# Patient Record
Sex: MALE | Race: WHITE | NOT HISPANIC OR LATINO | URBAN - METROPOLITAN AREA
[De-identification: names, ages, dates, MRNs, and addresses within clinical notes are randomized per-mention and may not be internally consistent; named-entity substitution may affect disease eponyms.]

---

## 2017-06-08 ENCOUNTER — EMERGENCY (EMERGENCY)
Facility: HOSPITAL | Age: 32
LOS: 0 days | Discharge: HOME | End: 2017-06-09
Attending: EMERGENCY MEDICINE | Admitting: EMERGENCY MEDICINE
Payer: MEDICAID

## 2017-06-08 VITALS
HEART RATE: 77 BPM | RESPIRATION RATE: 17 BRPM | DIASTOLIC BLOOD PRESSURE: 83 MMHG | OXYGEN SATURATION: 100 % | TEMPERATURE: 98 F | SYSTOLIC BLOOD PRESSURE: 116 MMHG

## 2017-06-08 VITALS — WEIGHT: 154.98 LBS | HEIGHT: 71 IN

## 2017-06-08 DIAGNOSIS — M54.5 LOW BACK PAIN: ICD-10-CM

## 2017-06-08 DIAGNOSIS — M54.30 SCIATICA, UNSPECIFIED SIDE: ICD-10-CM

## 2017-06-08 LAB
ALBUMIN SERPL ELPH-MCNC: 4.1 G/DL — SIGNIFICANT CHANGE UP (ref 3.3–5)
ALP SERPL-CCNC: 70 U/L — SIGNIFICANT CHANGE UP (ref 40–120)
ALT FLD-CCNC: 24 U/L — SIGNIFICANT CHANGE UP (ref 12–78)
ANION GAP SERPL CALC-SCNC: 3 MMOL/L — LOW (ref 5–17)
APTT BLD: 28 SEC — SIGNIFICANT CHANGE UP (ref 27.5–37.4)
AST SERPL-CCNC: 15 U/L — SIGNIFICANT CHANGE UP (ref 15–37)
BASOPHILS # BLD AUTO: 0.1 K/UL — SIGNIFICANT CHANGE UP (ref 0–0.2)
BASOPHILS NFR BLD AUTO: 0.9 % — SIGNIFICANT CHANGE UP (ref 0–2)
BILIRUB SERPL-MCNC: 0.4 MG/DL — SIGNIFICANT CHANGE UP (ref 0.2–1.2)
BUN SERPL-MCNC: 10 MG/DL — SIGNIFICANT CHANGE UP (ref 7–23)
CALCIUM SERPL-MCNC: 8.7 MG/DL — SIGNIFICANT CHANGE UP (ref 8.5–10.1)
CHLORIDE SERPL-SCNC: 105 MMOL/L — SIGNIFICANT CHANGE UP (ref 96–108)
CO2 SERPL-SCNC: 30 MMOL/L — SIGNIFICANT CHANGE UP (ref 22–31)
CREAT SERPL-MCNC: 0.77 MG/DL — SIGNIFICANT CHANGE UP (ref 0.5–1.3)
EOSINOPHIL # BLD AUTO: 0 K/UL — SIGNIFICANT CHANGE UP (ref 0–0.5)
EOSINOPHIL NFR BLD AUTO: 0.5 % — SIGNIFICANT CHANGE UP (ref 0–6)
ERYTHROCYTE [SEDIMENTATION RATE] IN BLOOD: 2 MM/HR — SIGNIFICANT CHANGE UP (ref 0–15)
GLUCOSE SERPL-MCNC: 96 MG/DL — SIGNIFICANT CHANGE UP (ref 70–99)
HCT VFR BLD CALC: 45.7 % — SIGNIFICANT CHANGE UP (ref 39–50)
HGB BLD-MCNC: 15.5 G/DL — SIGNIFICANT CHANGE UP (ref 13–17)
INR BLD: 0.95 RATIO — SIGNIFICANT CHANGE UP (ref 0.88–1.16)
LYMPHOCYTES # BLD AUTO: 1.7 K/UL — SIGNIFICANT CHANGE UP (ref 1–3.3)
LYMPHOCYTES # BLD AUTO: 19.5 % — SIGNIFICANT CHANGE UP (ref 13–44)
MCHC RBC-ENTMCNC: 29.7 PG — SIGNIFICANT CHANGE UP (ref 27–34)
MCHC RBC-ENTMCNC: 34 GM/DL — SIGNIFICANT CHANGE UP (ref 32–36)
MCV RBC AUTO: 87.4 FL — SIGNIFICANT CHANGE UP (ref 80–100)
MONOCYTES # BLD AUTO: 0.6 K/UL — SIGNIFICANT CHANGE UP (ref 0–0.9)
MONOCYTES NFR BLD AUTO: 6.3 % — SIGNIFICANT CHANGE UP (ref 2–14)
NEUTROPHILS # BLD AUTO: 6.5 K/UL — SIGNIFICANT CHANGE UP (ref 1.8–7.4)
NEUTROPHILS NFR BLD AUTO: 72.8 % — SIGNIFICANT CHANGE UP (ref 43–77)
PLATELET # BLD AUTO: 149 K/UL — LOW (ref 150–400)
POTASSIUM SERPL-MCNC: 4.2 MMOL/L — SIGNIFICANT CHANGE UP (ref 3.5–5.3)
POTASSIUM SERPL-SCNC: 4.2 MMOL/L — SIGNIFICANT CHANGE UP (ref 3.5–5.3)
PROT SERPL-MCNC: 6.9 GM/DL — SIGNIFICANT CHANGE UP (ref 6–8.3)
PROTHROM AB SERPL-ACNC: 10.3 SEC — SIGNIFICANT CHANGE UP (ref 9.8–12.7)
RBC # BLD: 5.23 M/UL — SIGNIFICANT CHANGE UP (ref 4.2–5.8)
RBC # FLD: 12.1 % — SIGNIFICANT CHANGE UP (ref 10.3–14.5)
SODIUM SERPL-SCNC: 138 MMOL/L — SIGNIFICANT CHANGE UP (ref 135–145)
WBC # BLD: 8.9 K/UL — SIGNIFICANT CHANGE UP (ref 3.8–10.5)
WBC # FLD AUTO: 8.9 K/UL — SIGNIFICANT CHANGE UP (ref 3.8–10.5)

## 2017-06-08 PROCEDURE — 99285 EMERGENCY DEPT VISIT HI MDM: CPT

## 2017-06-08 PROCEDURE — 74177 CT ABD & PELVIS W/CONTRAST: CPT | Mod: 26

## 2017-06-08 PROCEDURE — 72100 X-RAY EXAM L-S SPINE 2/3 VWS: CPT | Mod: 26

## 2017-06-08 RX ORDER — HYDROMORPHONE HYDROCHLORIDE 2 MG/ML
1 INJECTION INTRAMUSCULAR; INTRAVENOUS; SUBCUTANEOUS ONCE
Qty: 0 | Refills: 0 | Status: DISCONTINUED | OUTPATIENT
Start: 2017-06-08 | End: 2017-06-08

## 2017-06-08 RX ORDER — KETOROLAC TROMETHAMINE 30 MG/ML
30 SYRINGE (ML) INJECTION ONCE
Qty: 0 | Refills: 0 | Status: DISCONTINUED | OUTPATIENT
Start: 2017-06-08 | End: 2017-06-08

## 2017-06-08 RX ORDER — MORPHINE SULFATE 50 MG/1
4 CAPSULE, EXTENDED RELEASE ORAL ONCE
Qty: 0 | Refills: 0 | Status: DISCONTINUED | OUTPATIENT
Start: 2017-06-08 | End: 2017-06-08

## 2017-06-08 RX ORDER — SODIUM CHLORIDE 9 MG/ML
1000 INJECTION INTRAMUSCULAR; INTRAVENOUS; SUBCUTANEOUS ONCE
Qty: 0 | Refills: 0 | Status: COMPLETED | OUTPATIENT
Start: 2017-06-08 | End: 2017-06-08

## 2017-06-08 RX ADMIN — SODIUM CHLORIDE 1500 MILLILITER(S): 9 INJECTION INTRAMUSCULAR; INTRAVENOUS; SUBCUTANEOUS at 20:20

## 2017-06-08 RX ADMIN — Medication 30 MILLIGRAM(S): at 20:35

## 2017-06-08 RX ADMIN — Medication 30 MILLIGRAM(S): at 20:20

## 2017-06-08 RX ADMIN — HYDROMORPHONE HYDROCHLORIDE 1 MILLIGRAM(S): 2 INJECTION INTRAMUSCULAR; INTRAVENOUS; SUBCUTANEOUS at 21:48

## 2017-06-08 NOTE — ED STATDOCS - OBJECTIVE STATEMENT
32 y/o M with a h/o bulging disc to lower back a few years ago, HTN, presenting to ED for worsening lower back pain, radiating to "love handle" area, x5 days. Initially, the pain started out as sharp, then started to radiate across lower back, feeling like a band around the area. Pt denies fevers, vomiting, diarrhea. He was seen by his PCP, and was told he had swollen lymph nodes to lower back. Pt was given prednisone and pain meds with no improvement in sx. +occasional cramps to bilat LE. NKDA. Pt is on gabapentin 600 mg twice per day. 32 y/o M with a h/o bulging disc to lower back a few years ago on meloxicam (last took meloxicam this AM), HTN, presenting to ED for worsening lower back pain, radiating to "love handle" area, x5 days. Initially, the pain started out as sharp, then started to radiate across lower back, feeling like a band around the area. Pt denies fevers, vomiting, diarrhea. He was seen by his PCP, and was told he had swollen lymph nodes to lower back, occasionally feeling pain to lower abdominal area bilat.. Pt was given prednisone and pain meds with no improvement in sx. +occasional cramps to bilat LE. NKDA. Pt is on gabapentin 600 mg twice per day. 32 y/o M with a h/o bulging disc to lower back a few years ago on meloxicam (last took meloxicam this AM), HTN, presenting to ED for worsening lower back pain, radiating to "love handle" area, x5 days. Initially, the pain started out as sharp, then started to radiate across lower back, feeling like a band around the area. Pt denies fevers, vomiting, diarrhea. He was seen by his PCP, and was told he had swollen lymph nodes to lower back, occasionally feeling pain to lower abdominal area bilat. Pt was given prednisone and pain meds with no improvement in sx.  Pt started on abx a few days ago as well by his PCP because his significant other was recently dx with C. diff, however, pt denies any diarrhea. +occasional cramps to bilat LE. NKDA. Pt is on gabapentin 600 mg twice per day.

## 2017-06-08 NOTE — ED ADULT NURSE REASSESSMENT NOTE - NS ED NURSE REASSESS COMMENT FT1
pt sitting up comfortably in chair, ambulatory in ED, states pain has improved but persists. VSS, will continue to monitor.

## 2017-06-08 NOTE — ED ADULT NURSE NOTE - OBJECTIVE STATEMENT
Pt states he has been having lower back pain x 5 days.  Denies N/V?diarrhea or fever.  pt states that he feels "bumps in his lower back"/

## 2017-06-08 NOTE — ED STATDOCS - NS ED MD SCRIBE ATTENDING SCRIBE SECTIONS
REVIEW OF SYSTEMS/PHYSICAL EXAM/PAST MEDICAL/SURGICAL/SOCIAL HISTORY/DISPOSITION/HISTORY OF PRESENT ILLNESS/RESULTS/PROGRESS NOTE

## 2017-06-08 NOTE — ED STATDOCS - MUSCULOSKELETAL FINDINGS, MLM
mild tenderness in regions of bilat PSIS. ?subcentimeter lymphadenopathy vs lipomas. nml gait. strength 5/5. sensation intact.

## 2017-06-08 NOTE — ED STATDOCS - MEDICAL DECISION MAKING DETAILS
31y M w/ back pain radiating to flank.  Concern for musculoskeletal pain vs renal stone.  Plan for labs, imaging, reassess.

## 2017-06-08 NOTE — ED STATDOCS - PROGRESS NOTE DETAILS
REINIER Mireles: Patient has been seen, evaluated and orders have been written by the attending in intake. Patient is stable.  I will follow up the results of orders written and I will continue to evaluate/observe the patient. signed Jihan Mireles PA-C   Pt CT results pending. Feels better after dilaudid and toradol. musculoskeletal pain vs possible intraabdominal pathology. Dr Godfrey to f/u on results. Attending Godfrey, reviewed with pt result so test.  plan d/c and follow up with pmd.

## 2017-06-08 NOTE — ED STATDOCS - CARE PLAN
Principal Discharge DX:	Acute low back pain, unspecified back pain laterality, with sciatica presence unspecified

## 2017-06-09 RX ORDER — IBUPROFEN 200 MG
1 TABLET ORAL
Qty: 30 | Refills: 0 | OUTPATIENT
Start: 2017-06-09

## 2017-06-09 RX ORDER — CYCLOBENZAPRINE HYDROCHLORIDE 10 MG/1
1 TABLET, FILM COATED ORAL
Qty: 15 | Refills: 0 | OUTPATIENT
Start: 2017-06-09

## 2017-09-10 ENCOUNTER — TRANSCRIPTION ENCOUNTER (OUTPATIENT)
Age: 32
End: 2017-09-10

## 2017-09-11 ENCOUNTER — RESULT REVIEW (OUTPATIENT)
Age: 32
End: 2017-09-11

## 2017-09-11 ENCOUNTER — OUTPATIENT (OUTPATIENT)
Dept: OUTPATIENT SERVICES | Facility: HOSPITAL | Age: 32
LOS: 1 days | Discharge: HOME | End: 2017-09-11
Payer: MEDICAID

## 2017-09-11 DIAGNOSIS — D48.1 NEOPLASM OF UNCERTAIN BEHAVIOR OF CONNECTIVE AND OTHER SOFT TISSUE: ICD-10-CM

## 2017-09-11 PROCEDURE — 11404 EXC TR-EXT B9+MARG 3.1-4 CM: CPT

## 2017-09-11 PROCEDURE — 88304 TISSUE EXAM BY PATHOLOGIST: CPT

## 2017-09-11 PROCEDURE — 88304 TISSUE EXAM BY PATHOLOGIST: CPT | Mod: 26

## 2017-09-11 PROCEDURE — 12032 INTMD RPR S/A/T/EXT 2.6-7.5: CPT

## 2017-09-15 DIAGNOSIS — D17.1 BENIGN LIPOMATOUS NEOPLASM OF SKIN AND SUBCUTANEOUS TISSUE OF TRUNK: ICD-10-CM

## 2017-09-15 DIAGNOSIS — E03.9 HYPOTHYROIDISM, UNSPECIFIED: ICD-10-CM

## 2017-10-10 ENCOUNTER — TRANSCRIPTION ENCOUNTER (OUTPATIENT)
Age: 32
End: 2017-10-10

## 2017-10-19 ENCOUNTER — EMERGENCY (EMERGENCY)
Facility: HOSPITAL | Age: 32
LOS: 0 days | Discharge: HOME | End: 2017-10-19
Attending: EMERGENCY MEDICINE | Admitting: EMERGENCY MEDICINE
Payer: MEDICAID

## 2017-10-19 VITALS — HEIGHT: 71 IN | WEIGHT: 151.9 LBS

## 2017-10-19 VITALS
RESPIRATION RATE: 18 BRPM | DIASTOLIC BLOOD PRESSURE: 87 MMHG | OXYGEN SATURATION: 98 % | HEART RATE: 77 BPM | SYSTOLIC BLOOD PRESSURE: 137 MMHG | TEMPERATURE: 98 F

## 2017-10-19 DIAGNOSIS — R51 HEADACHE: ICD-10-CM

## 2017-10-19 DIAGNOSIS — G89.29 OTHER CHRONIC PAIN: ICD-10-CM

## 2017-10-19 DIAGNOSIS — M54.9 DORSALGIA, UNSPECIFIED: ICD-10-CM

## 2017-10-19 DIAGNOSIS — M54.2 CERVICALGIA: ICD-10-CM

## 2017-10-19 PROCEDURE — 99283 EMERGENCY DEPT VISIT LOW MDM: CPT

## 2017-10-19 RX ORDER — KETOROLAC TROMETHAMINE 30 MG/ML
30 SYRINGE (ML) INJECTION ONCE
Qty: 0 | Refills: 0 | Status: DISCONTINUED | OUTPATIENT
Start: 2017-10-19 | End: 2017-10-19

## 2017-10-19 RX ORDER — HYDROMORPHONE HYDROCHLORIDE 2 MG/ML
1 INJECTION INTRAMUSCULAR; INTRAVENOUS; SUBCUTANEOUS ONCE
Qty: 0 | Refills: 0 | Status: DISCONTINUED | OUTPATIENT
Start: 2017-10-19 | End: 2017-10-19

## 2017-10-19 RX ADMIN — HYDROMORPHONE HYDROCHLORIDE 1 MILLIGRAM(S): 2 INJECTION INTRAMUSCULAR; INTRAVENOUS; SUBCUTANEOUS at 17:23

## 2017-10-19 RX ADMIN — Medication 30 MILLIGRAM(S): at 17:23

## 2017-10-19 NOTE — ED STATDOCS - PHYSICAL EXAMINATION
GEN: AOX3, NAD. HEENT: Throat clear. Head NC/AT. NECK: Supple, No JVD. FROM. C-spine non-tender. CV:S1S2, RRR, LUNGS: CTA/b/l, no w/r/r. ABD: Soft, NT/ND, no rebound, no guarding. No CVAT. EXT: No e/c/c. 2+ distal pulses. SKIN: No rashes. NEURO: No focal deficits. CN II-XII intact. FROM. 5/5 motor and sensory. REINIER Reddy

## 2017-10-19 NOTE — ED STATDOCS - PROGRESS NOTE DETAILS
Patient is feeling much better, tests/labs reviewed. case discussed with attending. OK to dc home. REINIER Reddy Patient is well known to Dr. Mobley. Dr. Mobley recommends one dose of Toradol and Dilaudid and then DC home. No prescriptions for narcotics because patient has pain management. Patient is feeling much better, tests/labs reviewed. case discussed with attending. OK to dc home. REINIER Reddy

## 2017-10-19 NOTE — ED STATDOCS - MEDICAL DECISION MAKING DETAILS
Exacerbation of chronic pain.  Given pain meds in ED. No Rx for narcotics.  F/u with pain management.

## 2017-10-19 NOTE — ED ADULT NURSE NOTE - OBJECTIVE STATEMENT
pt amb to ST1 w/ cane, hx of herniated discs, and spinal lipomas presents to the ED c/o chronic body pain. +Back pain, HA, Neck pain starting a few days ago. Pt says he fell over a few days ago in pain and needed help getting off the floor. Pt says he his pain keeps getting worse with no relief. SL placed, no labs ordered @ this time, meds as per orders, will reassess.

## 2017-10-19 NOTE — ED ADULT TRIAGE NOTE - CHIEF COMPLAINT QUOTE
Pt. to the ED C/O generalized body pain and back pain- Hx. of Back pain with removal of Lympoma Tumors  in the past - Pt. states being on pain management for chronic pain

## 2017-10-19 NOTE — ED STATDOCS - OBJECTIVE STATEMENT
33 y/o male with hx of herniated discs, and spinal lipomas presents to the ED c/o chronic body pain. +Back pain, HA, Neck pain starting a few days ago. Pt says he fell over a few days ago in pain and needed help getting off the floor. Pt says he his pain keeps getting worse with no relief.  Pt was here in June with spinal lipomas which were then removed. Pt has followed up with Dr Knowles for removal of lipomas. PCP Dr. Porter. Pain management in [place]. 31 y/o male with hx of herniated discs, and spinal lipomas presents to the ED c/o chronic body pain. +Back pain, HA, Neck pain starting a few days ago. Pt says he fell over a few days ago in pain and needed help getting off the floor. Pt says he his pain keeps getting worse with no relief.  Pt was here in June with back lipomas which were then removed. Pt has followed up with Dr Knowles for removal of lipomas. PCP Dr. Porter. Pain management in [place].

## 2018-05-07 ENCOUNTER — EMERGENCY (EMERGENCY)
Facility: HOSPITAL | Age: 33
LOS: 0 days | Discharge: HOME | End: 2018-05-07
Attending: EMERGENCY MEDICINE | Admitting: EMERGENCY MEDICINE
Payer: MEDICAID

## 2018-05-07 VITALS
TEMPERATURE: 98 F | HEART RATE: 67 BPM | OXYGEN SATURATION: 100 % | DIASTOLIC BLOOD PRESSURE: 95 MMHG | SYSTOLIC BLOOD PRESSURE: 138 MMHG | RESPIRATION RATE: 18 BRPM

## 2018-05-07 VITALS — WEIGHT: 139.99 LBS | HEIGHT: 71 IN

## 2018-05-07 DIAGNOSIS — R53.83 OTHER FATIGUE: ICD-10-CM

## 2018-05-07 DIAGNOSIS — K64.5 PERIANAL VENOUS THROMBOSIS: ICD-10-CM

## 2018-05-07 DIAGNOSIS — M79.1 MYALGIA: ICD-10-CM

## 2018-05-07 DIAGNOSIS — Z79.899 OTHER LONG TERM (CURRENT) DRUG THERAPY: ICD-10-CM

## 2018-05-07 PROCEDURE — 99284 EMERGENCY DEPT VISIT MOD MDM: CPT

## 2018-05-07 RX ORDER — LIDOCAINE 4 G/100G
1 CREAM TOPICAL ONCE
Qty: 0 | Refills: 0 | Status: COMPLETED | OUTPATIENT
Start: 2018-05-07 | End: 2018-05-07

## 2018-05-07 RX ORDER — DOCUSATE SODIUM 100 MG
1 CAPSULE ORAL
Qty: 30 | Refills: 0 | OUTPATIENT
Start: 2018-05-07 | End: 2018-06-05

## 2018-05-07 RX ORDER — KETOROLAC TROMETHAMINE 30 MG/ML
30 SYRINGE (ML) INJECTION ONCE
Qty: 0 | Refills: 0 | Status: DISCONTINUED | OUTPATIENT
Start: 2018-05-07 | End: 2018-05-07

## 2018-05-07 RX ADMIN — LIDOCAINE 1 APPLICATION(S): 4 CREAM TOPICAL at 22:58

## 2018-05-07 RX ADMIN — Medication 30 MILLIGRAM(S): at 22:58

## 2018-05-07 NOTE — ED PROVIDER NOTE - PROGRESS NOTE DETAILS
case d/w Dr Moreland (colorectal ) and recommend stool softner, high fiber and f/u in office tomorrow

## 2018-05-07 NOTE — ED PROVIDER NOTE - OBJECTIVE STATEMENT
33 y/o Male with a PMHx of HTN, herniated cervical disc, Lipoma presents to ED c/o hemorrhoids. Pt states that he got pricked by an object to his finger 3 days ago and has since been feeling lethargic and body aches. Treated with Desitin, and prescription cream. Chills but no fever. Pt taking ASA.

## 2018-05-08 ENCOUNTER — APPOINTMENT (OUTPATIENT)
Dept: COLORECTAL SURGERY | Facility: CLINIC | Age: 33
End: 2018-05-08
Payer: MEDICAID

## 2018-05-08 VITALS
WEIGHT: 140 LBS | DIASTOLIC BLOOD PRESSURE: 72 MMHG | HEIGHT: 71 IN | HEART RATE: 80 BPM | BODY MASS INDEX: 19.6 KG/M2 | RESPIRATION RATE: 14 BRPM | SYSTOLIC BLOOD PRESSURE: 106 MMHG | TEMPERATURE: 98 F

## 2018-05-08 DIAGNOSIS — Z87.898 PERSONAL HISTORY OF OTHER SPECIFIED CONDITIONS: ICD-10-CM

## 2018-05-08 DIAGNOSIS — Z86.59 PERSONAL HISTORY OF OTHER MENTAL AND BEHAVIORAL DISORDERS: ICD-10-CM

## 2018-05-08 DIAGNOSIS — K64.5 PERIANAL VENOUS THROMBOSIS: ICD-10-CM

## 2018-05-08 DIAGNOSIS — Z87.39 PERSONAL HISTORY OF OTHER DISEASES OF THE MUSCULOSKELETAL SYSTEM AND CONNECTIVE TISSUE: ICD-10-CM

## 2018-05-08 DIAGNOSIS — Z87.891 PERSONAL HISTORY OF NICOTINE DEPENDENCE: ICD-10-CM

## 2018-05-08 DIAGNOSIS — R19.4 CHANGE IN BOWEL HABIT: ICD-10-CM

## 2018-05-08 PROBLEM — Z00.00 ENCOUNTER FOR PREVENTIVE HEALTH EXAMINATION: Status: ACTIVE | Noted: 2018-05-08

## 2018-05-08 PROCEDURE — 99203 OFFICE O/P NEW LOW 30 MIN: CPT

## 2018-05-08 RX ORDER — GUANFACINE 1 MG/1
1 TABLET ORAL
Qty: 30 | Refills: 0 | Status: DISCONTINUED | COMMUNITY
Start: 2018-03-23

## 2018-05-08 RX ORDER — DICLOFENAC SODIUM 75 MG/1
75 TABLET, DELAYED RELEASE ORAL
Qty: 60 | Refills: 0 | Status: ACTIVE | COMMUNITY
Start: 2018-04-06

## 2018-05-08 RX ORDER — HYDROCORTISONE ACETATE, PRAMOXINE HCL 2.5; 1 G/100G; G/100G
2.5-1 CREAM TOPICAL 3 TIMES DAILY
Qty: 30 | Refills: 1 | Status: ACTIVE | COMMUNITY
Start: 2018-05-08 | End: 1900-01-01

## 2018-05-08 RX ORDER — AMITRIPTYLINE HYDROCHLORIDE 75 MG/1
75 TABLET, FILM COATED ORAL
Qty: 30 | Refills: 0 | Status: ACTIVE | COMMUNITY
Start: 2018-03-23

## 2018-05-08 RX ORDER — ACETAMINOPHEN AND CODEINE 300; 30 MG/1; MG/1
300-30 TABLET ORAL
Qty: 60 | Refills: 0 | Status: DISCONTINUED | COMMUNITY
Start: 2017-11-10

## 2018-05-08 RX ORDER — DICLOFENAC SODIUM 50 MG/1
50 TABLET, DELAYED RELEASE ORAL
Qty: 60 | Refills: 0 | Status: DISCONTINUED | COMMUNITY
Start: 2018-03-23

## 2018-05-08 RX ORDER — GABAPENTIN 600 MG/1
600 TABLET, COATED ORAL
Qty: 90 | Refills: 0 | Status: ACTIVE | COMMUNITY
Start: 2017-08-29

## 2018-05-08 RX ORDER — CHLORZOXAZONE 500 MG/1
500 TABLET ORAL
Qty: 120 | Refills: 0 | Status: ACTIVE | COMMUNITY
Start: 2017-10-27

## 2018-05-08 RX ORDER — PREGABALIN 50 MG/1
50 CAPSULE ORAL
Qty: 90 | Refills: 0 | Status: DISCONTINUED | COMMUNITY
Start: 2017-12-14

## 2018-05-08 RX ORDER — LIDOCAINE 50 MG/G
5 CREAM TOPICAL
Qty: 1 | Refills: 2 | Status: ACTIVE | COMMUNITY
Start: 2018-05-08 | End: 1900-01-01

## 2018-05-08 RX ORDER — DULOXETINE HYDROCHLORIDE 60 MG/1
60 CAPSULE, DELAYED RELEASE PELLETS ORAL
Qty: 30 | Refills: 0 | Status: DISCONTINUED | COMMUNITY
Start: 2017-11-13

## 2018-05-08 RX ORDER — ACETAMINOPHEN AND CODEINE PHOSPHATE 300; 15 MG/1; MG/1
300-15 TABLET ORAL
Qty: 30 | Refills: 0 | Status: ACTIVE | COMMUNITY
Start: 2018-04-13

## 2018-05-08 RX ORDER — MORPHINE SULFATE 30 MG/1
30 TABLET, FILM COATED, EXTENDED RELEASE ORAL
Qty: 60 | Refills: 0 | Status: ACTIVE | COMMUNITY
Start: 2018-04-17

## 2018-05-08 RX ORDER — PREGABALIN 75 MG/1
75 CAPSULE ORAL
Qty: 90 | Refills: 0 | Status: ACTIVE | COMMUNITY
Start: 2018-04-06

## 2018-05-08 RX ORDER — TIZANIDINE HYDROCHLORIDE 2 MG/1
2 CAPSULE ORAL
Qty: 30 | Refills: 0 | Status: ACTIVE | COMMUNITY
Start: 2018-03-01

## 2018-05-08 RX ORDER — BUTALBITAL, ACETAMINOPHEN AND CAFFEINE 325; 50; 40 MG/1; MG/1; MG/1
50-325-40 TABLET ORAL
Qty: 150 | Refills: 0 | Status: ACTIVE | COMMUNITY
Start: 2018-04-06

## 2018-05-08 RX ORDER — CLONAZEPAM 0.5 MG/1
0.5 TABLET ORAL
Qty: 30 | Refills: 0 | Status: ACTIVE | COMMUNITY
Start: 2018-04-06

## 2018-05-08 RX ORDER — METHYLPREDNISOLONE 4 MG/1
4 TABLET ORAL
Qty: 21 | Refills: 0 | Status: DISCONTINUED | COMMUNITY
Start: 2018-04-27

## 2018-05-08 RX ORDER — QUETIAPINE FUMARATE 300 MG/1
300 TABLET ORAL
Qty: 30 | Refills: 0 | Status: ACTIVE | COMMUNITY
Start: 2018-03-15

## 2018-05-08 RX ORDER — CLONIDINE HYDROCHLORIDE 0.1 MG/1
0.1 TABLET ORAL
Qty: 30 | Refills: 0 | Status: ACTIVE | COMMUNITY
Start: 2017-11-03

## 2018-05-08 RX ORDER — MORPHINE SULFATE 15 MG/1
15 TABLET, FILM COATED, EXTENDED RELEASE ORAL
Qty: 60 | Refills: 0 | Status: DISCONTINUED | COMMUNITY
Start: 2017-12-06

## 2018-05-08 RX ORDER — METAXALONE 800 MG/1
800 TABLET ORAL
Qty: 21 | Refills: 0 | Status: ACTIVE | COMMUNITY
Start: 2018-04-27

## 2018-05-08 RX ORDER — DULOXETINE HYDROCHLORIDE 30 MG/1
30 CAPSULE, DELAYED RELEASE PELLETS ORAL
Qty: 90 | Refills: 0 | Status: ACTIVE | COMMUNITY
Start: 2018-04-17

## 2018-05-08 RX ORDER — NALOXEGOL OXALATE 12.5 MG/1
12.5 TABLET, FILM COATED ORAL
Qty: 30 | Refills: 0 | Status: DISCONTINUED | COMMUNITY
Start: 2018-02-27

## 2018-07-17 PROBLEM — I10 ESSENTIAL (PRIMARY) HYPERTENSION: Chronic | Status: ACTIVE | Noted: 2017-06-11

## 2018-07-17 PROBLEM — D17.9 BENIGN LIPOMATOUS NEOPLASM, UNSPECIFIED: Chronic | Status: ACTIVE | Noted: 2017-10-19

## 2018-07-17 PROBLEM — M50.20 OTHER CERVICAL DISC DISPLACEMENT, UNSPECIFIED CERVICAL REGION: Chronic | Status: ACTIVE | Noted: 2017-10-19

## 2018-07-19 ENCOUNTER — APPOINTMENT (OUTPATIENT)
Dept: INTERNAL MEDICINE | Facility: CLINIC | Age: 33
End: 2018-07-19

## 2018-07-21 ENCOUNTER — EMERGENCY (EMERGENCY)
Facility: HOSPITAL | Age: 33
LOS: 0 days | Discharge: HOME | End: 2018-07-21
Attending: STUDENT IN AN ORGANIZED HEALTH CARE EDUCATION/TRAINING PROGRAM | Admitting: STUDENT IN AN ORGANIZED HEALTH CARE EDUCATION/TRAINING PROGRAM
Payer: MEDICAID

## 2018-07-21 VITALS
TEMPERATURE: 98 F | SYSTOLIC BLOOD PRESSURE: 129 MMHG | HEART RATE: 79 BPM | DIASTOLIC BLOOD PRESSURE: 80 MMHG | OXYGEN SATURATION: 100 % | RESPIRATION RATE: 15 BRPM

## 2018-07-21 VITALS — WEIGHT: 145.06 LBS | HEIGHT: 71 IN

## 2018-07-21 DIAGNOSIS — L03.114 CELLULITIS OF LEFT UPPER LIMB: ICD-10-CM

## 2018-07-21 DIAGNOSIS — M54.9 DORSALGIA, UNSPECIFIED: ICD-10-CM
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
## 2018-07-21 DIAGNOSIS — M54.2 CERVICALGIA: ICD-10-CM

## 2018-07-21 DIAGNOSIS — G89.29 OTHER CHRONIC PAIN: ICD-10-CM

## 2018-07-21 DIAGNOSIS — S40.862A INSECT BITE (NONVENOMOUS) OF LEFT UPPER ARM, INITIAL ENCOUNTER: ICD-10-CM

## 2018-07-21 DIAGNOSIS — M79.602 PAIN IN LEFT ARM: ICD-10-CM
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
## 2018-07-21 DIAGNOSIS — R60.0 LOCALIZED EDEMA: ICD-10-CM

## 2018-07-21 PROCEDURE — 99284 EMERGENCY DEPT VISIT MOD MDM: CPT

## 2018-07-21 RX ADMIN — Medication 100 MILLIGRAM(S): at 15:42

## 2018-07-21 NOTE — ED STATDOCS - ATTENDING CONTRIBUTION TO CARE
I, Gabriela Aleman DO,  performed the initial face to face bedside interview with this patient regarding history of present illness, review of symptoms and relevant past medical, social and family history.  I completed an independent physical examination.  I was the initial provider who evaluated this patient. I have signed out the follow up of any pending tests (i.e. labs, radiological studies) to the ACP.  I have communicated the patient’s plan of care and disposition with the ACP.  The history, relevant review of systems, past medical and surgical history, medical decision making, and physical examination was documented by the scribe in my presence and I attest to the accuracy of the documentation.

## 2018-07-21 NOTE — ED ADULT NURSE NOTE - OBJECTIVE STATEMENT
34 y/o M presents c/o redness and swelling to left arm x 1 day. Pt took benadryl yesterday and today pta.

## 2018-07-21 NOTE — ED ADULT TRIAGE NOTE - CHIEF COMPLAINT QUOTE
Pt states he was bitten by an insect, possibly a spider last night, and redness, swelling, pain has spread up/down arm. Pt states pain radiates into his chest, has shortness of breath, aches and sweats.

## 2018-07-21 NOTE — ED STATDOCS - CARE PLAN
Principal Discharge DX:	Cellulitis of left upper extremity  Secondary Diagnosis:	Insect bite, initial encounter

## 2018-07-21 NOTE — ED STATDOCS - OBJECTIVE STATEMENT
32 y/o M with a PMHx of Back pain presenting to the ED c/o swelling and pain to left arm s/p insect bite yesterday at 1830. Reports that he was in his shed when he felt something bite his left arm. He did not see the insect but believes it was a spider. C/o worsening redness and swelling up his left arm. Pain radiates to chest with difficulty breathing secondary to pain. Took Benadryl without relief, last took this morning. 34 y/o M with a PMHx of Back pain presenting to the ED c/o swelling and pain to left arm s/p insect bite yesterday at 1830. Reports that he was in his shed when he felt something bite his left arm. He did not see the insect but believes it was a spider. C/o worsening redness and swelling up his left arm. No chest pain; no sob. Took Benadryl without relief, last took this morning. No hx of DM

## 2018-07-21 NOTE — ED STATDOCS - PROGRESS NOTE DETAILS
34 yo M with PMHx chronic neck and back pain presents to the ED c/o pain and swelling to L arm after insect bite last night at 6:30.  Pt reports he was in his shed when he felt something bite his L arm, he didn't see the insect but believes it was a spider.  Pt states his arm has become more red, swollen, and painful since last night.  Pt states he took benadryl w/o relief  PE: erythema of the volar aspect of the L arm, mild distal radiation, w/o abscess, no evidence of bite or puncture wound, lungs CTA b/l, RRR s1/s2  Plan: Rx for doxycycline, first dose here, f/u with PMD, strict return precautions given to pt 32 yo M with PMHx chronic neck and back pain presents to the ED c/o pain and swelling to L arm after insect bite last night at 6:30.  Pt reports he was in his shed when he felt something bite his L arm, he didn't see the insect but believes it was a spider.  Pt states his arm has become more red, swollen, and painful since last night.  Pt states he took benadryl w/o relief  PE: erythema of the volar aspect of the L arm, mild distal radiation, w/o abscess, no evidence of bite or puncture wound, lungs CTA b/l, RRR s1/s2  Plan: Rx for doxycycline, first dose here, f/u with PMD, strict return precautions given to pt if erythema, pain, swelling, worsening return to ED for IV abx.  Pt agreeable to d/c and plan of care, all questions answered   Heidy Pearson PA-C 34 yo M with PMHx chronic neck and back pain presents to the ED c/o pain and swelling to L arm after insect bite last night at 6:30.  Pt reports he was in his shed when he felt something bite his L arm, he didn't see the insect but believes it was a spider.  Pt states his arm has become more red, swollen, and painful since last night.  Pt states he took benadryl w/o relief  PE: erythema of the volar aspect of the L arm, mild distal radiation, w/o abscess, no evidence of bite or puncture wound, lungs CTA b/l, RRR s1/s2  Plan: Rx for doxycycline, first dose here, f/u with PMD, strict return precautions given to pt if erythema, pain, swelling, worsening return to ED for IV abx.  Pt agreeable to d/c and plan of care, all questions answered.  Pt advised to stay out of the sun while taking doxycycline   Heidy Pearson PA-C Ash DO: Long discussion with family and patient at bedside- no failure of outpatient antibiotics; immunizations UTD; no fever or chills; no hx of DM; trial PO antibiotics; motrin prn pain; instructed to f/u with PMD In 1-2 days without fail; strict return precautions given.

## 2018-07-21 NOTE — ED STATDOCS - SKIN, MLM
Erythema to volar aspect of left upper extremity with mild radiation distally. No evidence of any focal abscess.

## 2018-07-25 ENCOUNTER — RX RENEWAL (OUTPATIENT)
Age: 33
End: 2018-07-25

## 2018-07-25 RX ORDER — LIDOCAINE 5 G/100G
5 OINTMENT TOPICAL
Qty: 35.44 | Refills: 2 | Status: ACTIVE | COMMUNITY
Start: 2018-07-25 | End: 1900-01-01

## 2018-07-26 ENCOUNTER — APPOINTMENT (OUTPATIENT)
Dept: INTERNAL MEDICINE | Facility: CLINIC | Age: 33
End: 2018-07-26

## 2018-09-21 NOTE — ED ADULT NURSE NOTE - IS THE PATIENT ABLE TO BE SCREENED?
Hipolito Yancey

 Created on:2018



Patient:Hipolito Yancey

Sex:Male

:1963

External Reference #:[id number]





Demographics







 Address  [address] [place] [address]

 

 Mobile Phone  [phone]

 

 Email Address  [email or web address]

 

 Preferred Language  English

 

 Marital Status  Not  Or 

 

 Congregational Affiliation  Unknown

 

 Race  White

 

 Ethnic Group  Not  Or 









Author







 Organization  [facility]

 

 Address  [address]



   [place] [address]

 

 Phone  [phone]









Support







 Name  Relationship  Address  Phone

 

 Jennifer Yancey  Unavailable  Unavailable  [phone]









Care Team Providers







 Name  Role  Phone

 

 Abram Clark MD  Primary Care Physician  Unavailable









Payers







 Type  Date  Identification Numbers  Payment Provider  Subscriber

 

 Health Maintenance    Policy Number:  Medicare Blue o  Hipolito Yancey



 [facility] (O)    [id number]    









 Group Number: [id number]  [address] 

 

 PayID:   SUSANA Chaney [id number]









 Medigap Part B  Effective:  Policy Number:  Blue [facility]  Hipolito Yancey



   2017  [id number]  [facility]  









 Expires: 2017  Group Number: [id number]  [address] 









 PayID: [id number]  SUSA[place] Aaron [address]









 Medigap Part B    Policy Number: [id number]  Medicaid  Hipolito Yancey









 Group Name: 1 1  [address]

 

 PayID: [id number]  [place] [address]







Problems







 Description

 

 No Information







Family History







 Date  Family Member(s)  Problem(s)  Comments

 

   General  Heart Disease  

 

   General  Hypertension  

 

   General  Stroke  

 

   General  Cancer  







Social History







 Type  Date  Description  Comments

 

 Lives With    Alone  

 

 Occupation    Disabled  

 

 Smoking    Patient has never smoked  







Allergies, Adverse Reactions, Alerts







 Date  Description  Reaction  Status  Severity  Comments

 

 2017  NKDA    active    







Medications







 Medication  Date  Status  Form  Strength  Qnty  SIG  Indications  Ordering



                 Provider

 

 Compression    Active  Misc    2Pair    S92.324D  Fredy Hawkins  7          mmhg knee    TIM Martinez



             high    









 R60.0









 Loratadine    Active  Tablets  10mg    Take One    Unknown



             Tablet By    



             Mouth Every    



             Day    

 

 Simvastatin    Active  Tablets  20mg    Take One    Unknown



             Tablet By    



             Mouth AT    



             Bedtime For    



             Cholesterol    

 

 Levocetirizine    Active  Tablets  5mg    Take One    Unknown



 Dihydrochloride            Tablet By    



             Mouth Every    



             Day as Needed    

 

 Mometasone Furoate    Active  Suspension  50mcg/A    Spray Two    
Unknown



         ct    Sprays In Each    



             Nostril Every    



             Day    

 

 Ventolin HFA    Active  Aerosol  108(90B    Inhale Two    Unknown



         ase)    Puffs By Mouth    



         mcg/Act    Every 4 Hours    



             as Needed    

 

 Quetiapine    Active  Tablets  300mg    Take Two    Unknown



 Fumarate            Tablets By    



             Mouth Every    



             Day    

 

 Trazodone HCL    Active  Tablets  50mg    Take One    Unknown



             Tablet By    



             Mouth Twice A    



             Day    

 

 Clomipramine HCL    Active  Capsules  50mg    Take One    Unknown



             Capsule By    



             Mouth Every    



             Morning And    



             Three AT    



             Bedtime    

 

 Gabapentin    Active  Capsules  300mg    Take 1   2    Unknown



             Capsules By    



             Mouth Three    



             Times A Day    

 

 Aripiprazole    Active  Tablets  10mg    Take One    Unknown



             Tablet By    



             Mouth Every    



             Day    

 

 Hydroxyzine HCL    Active  Tablets  50mg    Take One    Unknown



             Tablet By    



             Mouth Three    



             Times A Day    

 

 Lorazepam    Active  Tablets  1mg    Take One    Unknown



             Tablet By    



             Mouth Three    



             Times A Day    



             Maximum Daily    



             Dose   3    

 

                 

 

 Clindamycin HCL  2018 -  Hx  Capsules  300mg  15c  take one    Fredy



   2018        aps  tablet by    Juan,



             mouth three    M.D.



             times a day    



             for 5 days.    

 

 Probiotic &  2018 -  Hx  Capsules    10c  Take one    Fredy



 Acidophilus  2018        aps  tablet daily    Juan,



 Formula Extra            by mouth for    M.D.



 Strength            10 days.    

 

 Diphenhydramine  2018 -  Hx  Tablets  25mg  30t  Take 1 by    Fredy



 HCL  2018        abs  mouth three    Juan,



             times a day    M.D.

 

 Oxycodone HCL  2018 -  Hx  Tablets  5mg  15t  1 tab by mouth    Alejandro 
F



   2018        abs  every 6 hours    Cynthia,



             as needed for    MD



             pain.    

 

 Nystatin   -  Hx  Suspension  138373U    Swish And    Unknown



   2017      nit/ML    Swallow 5 ML    



             By Mouth Four    



             Times A Day    



             For 7 Days    



             Retain In    

 

 Symbicort   -  Hx  Aerosol  160-4.5    Inhale Two    Unknown



   2017      mcg/Act    Puffs By Mouth    



             Twice A Day    

 

 Sulfamethoxazole/T   -  Hx  Tablets  800-160        Ramona,



 rimethoprim DS  2017      DILEEP [place]







Medications Administered in Office







 Medication  Date  Status  Form  Strength  Qnty  SIG  Indications  Ordering



                 Provider

 

 Triamcinolone  /  Administered  Injection          Sumanth



 (Kenalog)                MD Mary

 

 Triamcinolone  /  Administered  Injection          Sumanth



 (Kenalog)                MD Mary







Vital Signs







 Date  Vital  Result  Comment

 

 2018  Height  65 inches  5'5"









 Weight  220.00 lb  

 

 Heart Rate  78 /min  

 

 BP Systolic  146 mmHg  

 

 BP Diastolic  74 mmHg  

 

 Respiratory Rate  12 /min  

 

 Pain Level  5  

 

 BMI (Body Mass Index)  36.6 kg/m2  









 2018  Height  65 inches  5'5"









 Weight  220.00 lb  

 

 Heart Rate  84 /min  

 

 BP Systolic Sitting  126 mmHg  

 

 BP Diastolic Sitting  80 mmHg  

 

 Respiratory Rate  16 /min  

 

 Pain Level  0  

 

 BMI (Body Mass Index)  36.6 kg/m2  









 2018  Height  65 inches  5'5"









 Weight  220.00 lb  

 

 Heart Rate  67 /min  

 

 Respiratory Rate  15 /min  

 

 Pain Level  2  

 

 BMI (Body Mass Index)  36.6 kg/m2  









 2018  Height  65 inches  5'5"









 Weight  220.00 lb  

 

 Heart Rate  72 /min  

 

 BP Systolic Sitting  134 mmHg  

 

 BP Diastolic Sitting  84 mmHg  

 

 Respiratory Rate  19 /min  

 

 Body Temperature  97.0 F  

 

 Pain Level  0  

 

 BMI (Body Mass Index)  36.6 kg/m2  









 05/10/2018  Height  65 inches  5'5"









 Weight  220.00 lb  

 

 BP Systolic  134 mmHg  

 

 BP Diastolic  82 mmHg  

 

 Respiratory Rate  20 /min  

 

 Body Temperature  97.7 F  

 

 Pain Level  0  

 

 BMI (Body Mass Index)  36.6 kg/m2  









 2018  Height  65 inches  5'5"









 Weight  220.00 lb  

 

 BP Systolic  142 mmHg  

 

 BP Diastolic  80 mmHg  

 

 Respiratory Rate  18 /min  

 

 Pain Level  0  

 

 BMI (Body Mass Index)  36.6 kg/m2  









 2018  Heart Rate  74 /min  









 BP Systolic  138 mmHg  

 

 BP Diastolic  78 mmHg  

 

 Respiratory Rate  14 /min  

 

 Body Temperature  98.3 F  

 

 Pain Level  0  









 2018  Heart Rate  88 /min  









 BP Systolic  130 mmHg  

 

 BP Diastolic  80 mmHg  

 

 Respiratory Rate  16 /min  

 

 Body Temperature  97.2 F  









 2018  Height  65 inches  5'5"









 Weight  215.00 lb  

 

 Heart Rate  94 /min  

 

 Respiratory Rate  15 /min  

 

 Body Temperature  98.4 F  

 

 Pain Level  6  

 

 BMI (Body Mass Index)  35.8 kg/m2  









 2018  Height  65 inches  5'5"









 Weight  215.00 lb  

 

 BP Systolic  122 mmHg  

 

 BP Diastolic  68 mmHg  

 

 Respiratory Rate  20 /min  

 

 Body Temperature  97.8 F  

 

 Pain Level  5  

 

 BMI (Body Mass Index)  35.8 kg/m2  









 2018  Height  65 inches  5'5"









 Weight  215.00 lb  

 

 Heart Rate  88 /min  

 

 Respiratory Rate  17 /min  

 

 Body Temperature  98.1 F  

 

 Pain Level  0  

 

 BMI (Body Mass Index)  35.8 kg/m2  









 2018  Height  65 inches  5'5"









 Weight  215.00 lb  

 

 Heart Rate  83 /min  

 

 Respiratory Rate  14 /min  

 

 Body Temperature  97.3 F  

 

 Pain Level  5  

 

 BMI (Body Mass Index)  35.8 kg/m2  









 2018  Height  65 inches  5'5"









 Weight  215.00 lb  

 

 Heart Rate  79 /min  

 

 BP Systolic  125 mmHg  

 

 BP Diastolic  85 mmHg  

 

 Body Temperature  96.5 F  

 

 BMI (Body Mass Index)  35.8 kg/m2  









 2017  Height  65 inches  5'5"









 Weight  226.00 lb  

 

 Heart Rate  82 /min  

 

 Respiratory Rate  18 /min  

 

 Body Temperature  98.1 F  

 

 Pain Level  0  

 

 BMI (Body Mass Index)  37.6 kg/m2  









 2017  Height  65 inches  5'5"









 Weight  226.00 lb  

 

 BP Systolic  120 mmHg  

 

 BP Diastolic  70 mmHg  

 

 Respiratory Rate  20 /min  

 

 Pain Level  0  

 

 BMI (Body Mass Index)  37.6 kg/m2  









 2017  Heart Rate  68 /min  









 BP Systolic Sitting  118 mmHg  

 

 BP Diastolic Sitting  90 mmHg  

 

 Body Temperature  97.2 F  









 2017  Heart Rate  72 /min  









 BP Systolic  115 mmHg  

 

 BP Diastolic  90 mmHg  

 

 Body Temperature  96.7 F  









 2017  Height  65 inches  5'5"









 Weight  226.00 lb  

 

 Heart Rate  80 /min  

 

 BP Systolic Sitting  138 mmHg  

 

 BP Diastolic Sitting  96 mmHg  

 

 Respiratory Rate  14 /min  

 

 Body Temperature  97.5 F  

 

 BMI (Body Mass Index)  37.6 kg/m2  









 2017  Height  65 inches  5'5"









 Weight  218.00 lb  

 

 Heart Rate  78 /min  

 

 BP Systolic  132 mmHg  

 

 BP Diastolic  92 mmHg  

 

 Respiratory Rate  18 /min  

 

 Body Temperature  97.4 F  

 

 BMI (Body Mass Index)  36.3 kg/m2  









 2017  Height  65 inches  5'5"









 Weight  221.00 lb  

 

 Heart Rate  72 /min  

 

 BP Systolic Sitting  120 mmHg  

 

 BP Diastolic Sitting  84 mmHg  

 

 Respiratory Rate  14 /min  

 

 Body Temperature  99.0 F  

 

 BMI (Body Mass Index)  36.8 kg/m2  









 2017  Height  65 inches  5'5"









 Weight  218.00 lb  

 

 Heart Rate  90 /min  

 

 BP Systolic  134 mmHg  

 

 BP Diastolic  86 mmHg  

 

 Respiratory Rate  18 /min  

 

 Body Temperature  97.7 F  

 

 Pain Level  5  

 

 BMI (Body Mass Index)  36.3 kg/m2  









 2017  Height  64 inches  5'4"









 Weight  215.00 lb  

 

 Heart Rate  62 /min  

 

 BP Systolic  116 mmHg  

 

 BP Diastolic  82 mmHg  

 

 Respiratory Rate  16 /min  

 

 Body Temperature  96.9 F  

 

 BMI (Body Mass Index)  36.9 kg/m2  







Results







 Test  Date  Test  Result  H/L  Range  Note

 

 Laboratory test  2018  Surgical Pathology  SEE RESULT BELOW      1



 finding            

 

 CBC Auto Diff  2018  White Blood Count  11.3 10^3/uL  High  3.5-10.8  









 Red Blood Count  5.79 10^6/uL  High  4.0-5.4  

 

 Hemoglobin  17.1 g/dL    14.0-18.0  

 

 Hematocrit  51 %    42-52  

 

 Mean Corpuscular Volume  88 fL    80-94  

 

 Mean Corpuscular Hemoglobin  30 pg    27-31  

 

 Mean Corpuscular HGB Conc  34 g/dL    31-36  

 

 Red Cell Distribution Width  14 %    10.5-15  

 

 Platelet Count  235 10^3/uL    150-450  

 

 Mean Platelet Volume  8 um3    7.4-10.4  

 

 Abs Neutrophils  8.6 10^3/uL  High  1.5-7.7  

 

 Abs Lymphocytes  1.6 10^3/uL    1.0-4.8  

 

 Abs Monocytes  0.9 10^3/uL  High  0-0.8  

 

 Abs Eosinophils  0.1 10^3/uL    0-0.6  

 

 Abs Basophils  0.1 10^3/uL    0-0.2  

 

 Abs Nucleated RBC  0.1 10^3/uL      

 

 Granulocyte %  76.2 %    38-83  

 

 Lymphocyte %  14.4 %  Low  25-47  

 

 Monocyte %  7.8 %  High  0-7  

 

 Eosinophil %  1.0 %    0-6  

 

 Basophil %  0.6 %    0-2  

 

 Nucleated Red Blood Cells %  0.7      









 CBC Auto Diff  2017  White Blood Count  8.9 10^3/uL    3.5-10.8  









 Red Blood Count  6.13 10^6/uL  High  4.0-5.4  

 

 Hemoglobin  18.4 g/dL  High  14.0-18.0  

 

 Hematocrit  56 %  High  42-52  

 

 Mean Corpuscular Volume  91 fL    80-94  

 

 Mean Corpuscular Hemoglobin  30 pg    27-31  

 

 Mean Corpuscular HGB Conc  33 g/dL    31-36  

 

 Red Cell Distribution Width  14 %    10.5-15  

 

 Platelet Count  220 10^3/uL    150-450  

 

 Mean Platelet Volume  8 um3    7.4-10.4  

 

 Abs Neutrophils  5.7 10^3/uL    1.5-7.7  

 

 Abs Lymphocytes  2.1 10^3/uL    1.0-4.8  

 

 Abs Monocytes  1.0 10^3/uL  High  0-0.8  

 

 Abs Eosinophils  0.2 10^3/uL    0-0.6  

 

 Abs Basophils  0 10^3/uL    0-0.2  

 

 Abs Nucleated RBC  0.01 10^3/uL      

 

 Granulocyte %  63.5 %    38-83  

 

 Lymphocyte %  23.5 %  Low  25-47  

 

 Monocyte %  10.8 %  High  1-9  

 

 Eosinophil %  1.8 %    0-6  

 

 Basophil %  0.4 %    0-2  

 

 Nucleated Red Blood Cells %  0.1      









 Laboratory test finding  2017  Hepatitis C Antibody  Nonreactive    
Nonreactive  









 C Reactive Protein  2.52 mg/L    < 5.00  2









 HIV 1/2 AB Evaluation  2017  HIV 1 2 Antibody  Nonreactive    
Nonreactive  3

 

 Laboratory test finding  2017  C Reactive Protein  18.21 mg/L  High  < 
5.00  4

 

 CBC Auto Diff  2017  White Blood Count  9.5 10^3/uL    3.5-10.8  









 Red Blood Count  6.50 10^6/uL  High  4.0-5.4  

 

 Hemoglobin  19.8 g/dL  High  14.0-18.0  

 

 Hematocrit  61 %  High  42-52  

 

 Mean Corpuscular Volume  94 fL    80-94  

 

 Mean Corpuscular Hemoglobin  31 pg    27-31  

 

 Mean Corpuscular HGB Conc  33 g/dL    31-36  

 

 Red Cell Distribution Width  15 %    10.5-15  

 

 Platelet Count  206 10^3/uL    150-450  

 

 Mean Platelet Volume  9 um3    7.4-10.4  

 

 Abs Neutrophils  6.2 10^3/uL    1.5-7.7  

 

 Abs Lymphocytes  1.8 10^3/uL    1.0-4.8  

 

 Abs Monocytes  1.2 10^3/uL  High  0-0.8  

 

 Abs Eosinophils  0.2 10^3/uL    0-0.6  

 

 Abs Basophils  0.1 10^3/uL    0-0.2  

 

 Abs Nucleated RBC  0.08 10^3/uL      

 

 Granulocyte %  65.2 %    38-83  

 

 Lymphocyte %  19.2 %  Low  25-47  

 

 Monocyte %  12.7 %  High  1-9  

 

 Eosinophil %  2.2 %    0-6  

 

 Basophil %  0.7 %    0-2  

 

 Nucleated Red Blood Cells %  0.9      









 Laboratory test finding  2017  Erythrocyte Sed Rate  0 mm/Hr    0-20  5









 Lyme Disease Serology  Negative    Negative  6

 

 Vitamin D, 1,25 Dihydroxy  33 pg/mL    18-64  7









 1  SEE RESULT BELOW



   -----------------------------------------------------------------------------
---------------



   Name:  HIPOLITO YANCEY                 : 1963    Attend Dr: 
Alvin Rosales MD



   Acct:  [id number]  Unit: [id number]  AGE: 54            Location:  OR



   Re18                        SEX: M             Status:    GELY [facility]



   -----------------------------------------------------------------------------
---------------



   



   SPEC: [id number]             YASMEEN: 18-          SUBM DR: Alvin Rosales MD



   REQ:  [id number]             RECD: 



   STATUS: SOUT



   _



   ORDERED:  LEVEL 4, IMMUNO-FIRST



   



   FINAL DIAGNOSIS



   



   



   Tongue, right lateral aspect, biopsy:



   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  See 
comment.



   



   



   



   Comment: The sections demonstrates a somewhat sessile acutely



   inflamed verrucoid squamous proliferative lesion with focal



   architectural complexity and ulceration.  While focally



   suggestive, definitive invasion is not seen.  Dysplasia is



   difficult to evaluate due to the degree of acute inflammation



   and reactive changes.  The lesion extends to the unoriented



   inked surgical margin.  A p16 stain performed with appropriate



   controls on block B is positive confirming an HPV related



   etiology to this lesion.



   



   



   This likely represents a traumatized and inflamed papilloma



   though and inflamed and traumatized verrucoid carcinoma cannot



   be entirely excluded.  Conservative reexcision or other



   ablation of this lesion may be considered.



   Alternatively,close clinical follow-up is recommended.



   



   



   Dr. Hall has reviewed this case and concurs.



   



   



   PRE-OPERATIVE DIAGNOSIS



   



   Right tongue lesion



   



   



   



   



   



   ** CONTINUED ON NEXT PAGE **



   



   DEPARTMENT OF PATHOLOGY,  [address] [place] [address]



   Phone # [phone]      Fax #[phone]



   Liam Hall M.D. Director     [facility] # [id number]



   



   



   



   RUN DATE: 18               [facility] **LIVE**         
       PAGE    2



   



   -----------------------------------------------------------------------------
---------------



   Patient: HIPOLITO YANCEY                  [id number]     (Continued)



   -----------------------------------------------------------------------------
---------------



   



   GROSS DESCRIPTION          (Continued)



   



   



   GROSS DESCRIPTION



   



   The specimen is received in formalin labeled, Right Lateral Aspect of Tongue
, and consists



   of a 1.5 x 1.4 by up to 0.6 cm tan-gray ovoid wrinkled irregular soft tissue 
fragment which



   is inked, serially sectioned and submitted entirely in two cassettes.



   



   Signed by and Reported on: __________              Liam Hall MD  [id number]



   



   -----------------------------------------------------------------------------
---------------



   



   



   



   



   



   



   



   



   



   



   



   



   



   



   



   



   



   



   



   



   



   



   



   



   



   



   



   



   



   



   



   



   



   ** END OF REPORT **



   



   DEPARTMENT OF PATHOLOGY,  [address] [place] [address]



   Phone # [phone]      Fax #[phone]



   Liam Hall M.D. Director     [facility] # [id number]

 

 2  Acute inflammation:  >10.00

 

 3  It is recognized that currently available assays for the



   detection of antibodies to HIV-1 and/or HIV-2 may not detect



   all infected individuals. HIV antibodies may be undetectable



   in some stages of the infection and in some clinical



   conditions. The performance of this assay has not been



   established for populations of infants or children.



   



   Assayed by Chemiluminescence Microparticle Immunoassay on



   the Siemens Advia Centaur CP. Values obtained with different



   methods or kits cannot be used interchangeably.The



   diagnostic specificity of the ADVIA Centaur 1/O/2 Enhanced



   assay in the low risk population was 99.90% (6052/6058) with



   a 95% confidence interval of 99.78 to 99.96%.

 

 4  Acute inflammation:  >10.00

 

 5  [id number]

 

 6  Serologic response to B. burgdorferi infection is not



   detected, but cannot rule out early infection during



   which low or undetectable antibody levels to



   B. burgdorferi may be present. If clinically indicated,



   a new serum specimen should be submitted in 7-14 days.



   Test Performed by:



   [facility] - [address] [address]

 

 7  -------------------ADDITIONAL INFORMATION-------------------



   This test was developed and its performance characteristics



   determined by [facility] in a manner consistent with CLIA



   requirements. This test has not been cleared or approved by



   the U.S. Food and Drug Administration.



   Test Performed by:



   [facility] - [address] [address]







Procedures







 Date  CPT Code  Description  Status

 

 2018  79649  Repair Nonunion/Malunion Metatarsal  Completed

 

 2018  33175  Repair Nonunion/Malunion Metatarsal  Completed

 

 2018  70263  Injection Intralesional Up To And Including 7 Lesions  
Completed

 

 2018  75720  Injection Intralesional Up To And Including 7 Lesions  
Completed

 

 12/15/2017  77647  Dest Lesion Each Addl Lesion 2 Through 14 Each  Completed

 

 12/15/2017  17223  Destruction ALL Benign Or Premalignant Lesion (Other  
Completed



     Than [place]  

 

 2017  20067  Destruction ALL Benign Or Premalignant Lesion (Other  
Completed



     Than [place]  

 

 2017  94759  FX Metatarsal Care  Completed

 

 2017  03410  Repair Immediate Wound 2.6-5CM  Completed



     Face/Ear/Eyelid/Nose/Lip/Muc Mem  

 

 2017  06269  Excise Malig Lesion 1.1-2CM Face/Ear/Eyelid/Nose/Lip  
Completed







Encounters







 Type  Date  Location  Provider  CPT E/M  Dx

 

 Office Visit  2018  [facility]  Fredy Martinez  [address]856  
S92.324G



   11:15a  VERÓNICA.SOULEYMANE HURST.    

 

 Office Visit  2018  [facility]  Sumanth Hernandez MD  [address]  L90.5



   11:30a        









 R60.0

 

 D22.5

 

 Z80.8

 

 Z08

 

 Z85.828









 Office Visit  2018 10:15a  [facility]  Fredy Martinez  
[address]  S92.324K



     [place] HURST.    

 

 Office Visit  2018 11:30a  [facility]  Hunter Maxwell MD  
[address]  R60.0



     C.M.A.      

 

 Office Visit  03/15/2018 10:43a  [facility],  [address]  
L03.115



     [facility]  [place]    

 

 Office Visit  2018 11:15a  [facility]  Fredy Martinez  
[address]  S92.324G



     C.SOULEYMANE DUMONT    

 

 Office Visit  2018  9:45a  [facility]  Fredy Martinez  
[address]  S92.324D



     [place] DUMONT    

 

 Office Visit  2018 11:30a  [facility]  Sumanth Hernandez MD  [address]  
L21.8









 L90.5









 Office Visit  2017 11:30a  [facility]  Hunter Maxwell MD  [address]
  S92.324S



     Of [place]      









 M25.474









 Office Visit  2017  1:15p  [facility]  Fabiana Etienne  [address]  
S92.324D



     Of [place]  RPA-C    

 

 Office Visit  2017 11:30a  [facility] BYRD  [address]  R60.0



     Infectious Diseases  TIM Rhodes    

 

 Office Visit  2017  4:00p  [facility] BYRD  [address]  R60.0



     Infectious Diseases  TIM Rhodes    









 S92.324A

 

 D45

 

 S92.324D









 Office Visit  2017  3:20p  [facility]  Sumanth Hernandez MD  [address]  
C44.91

 

 Office Visit  2017  9:45a  [facility]  Ananda Pierre MD  
[address]  L05.92



     [facility]      







Plan of Care

Future Appointment(s):2018 11:30 am - [facility] M[facility]D. at [facility]2018 11:30 am - Sumanth Hernandez MD at [facility] Ggxejsuneuu17/11
/2018 - Fredy Martinez M.D.S92.324K Nondisp fx of 2nd metatarsal bone, r ft, 
7thKNew Xrays:CT Extremity Lower Right WwoFollow up:after testing is completed No

## 2018-09-29 NOTE — ED ADULT NURSE NOTE - CAS DISCH CONDITION
Implemented All Universal Safety Interventions:  [place] to call system. Call bell, personal items and telephone within reach. Instruct patient to call for assistance. Room bathroom lighting operational. Non-slip footwear when patient is off stretcher. Physically safe environment: no spills, clutter or unnecessary equipment. Stretcher in lowest position, wheels locked, appropriate side rails in place. Stable

## 2018-10-19 NOTE — ED STATDOCS - ATTENDING CONTRIBUTION TO CARE
General
I, Fercho Santoro DO,  performed the initial face to face bedside interview with this patient regarding history of present illness, review of symptoms and relevant past medical, social and family history.  I completed an independent physical examination.  I was the initial provider who evaluated this patient. I have signed out the follow up of any pending tests (i.e. labs, radiological studies) to the ACP.  I have communicated the patient’s plan of care and disposition with the ACP.  The history, relevant review of systems, past medical and surgical history, medical decision making, and physical examination was documented by the scribe in my presence and I attest to the accuracy of the documentation.

## 2018-12-17 ENCOUNTER — OTHER (OUTPATIENT)
Age: 33
End: 2018-12-17

## 2018-12-17 DIAGNOSIS — K62.89 OTHER SPECIFIED DISEASES OF ANUS AND RECTUM: ICD-10-CM

## 2019-02-26 ENCOUNTER — EMERGENCY (EMERGENCY)
Facility: HOSPITAL | Age: 34
LOS: 0 days | Discharge: HOME | End: 2019-02-26
Attending: EMERGENCY MEDICINE | Admitting: EMERGENCY MEDICINE
Payer: MEDICAID

## 2019-02-26 VITALS
OXYGEN SATURATION: 99 % | RESPIRATION RATE: 17 BRPM | SYSTOLIC BLOOD PRESSURE: 127 MMHG | TEMPERATURE: 98 F | HEART RATE: 69 BPM | DIASTOLIC BLOOD PRESSURE: 73 MMHG

## 2019-02-26 VITALS — WEIGHT: 134.92 LBS | HEIGHT: 71 IN

## 2019-02-26 DIAGNOSIS — L72.9 FOLLICULAR CYST OF THE SKIN AND SUBCUTANEOUS TISSUE, UNSPECIFIED: ICD-10-CM

## 2019-02-26 DIAGNOSIS — I10 ESSENTIAL (PRIMARY) HYPERTENSION: ICD-10-CM

## 2019-02-26 DIAGNOSIS — M54.6 PAIN IN THORACIC SPINE: ICD-10-CM

## 2019-02-26 DIAGNOSIS — M51.34 OTHER INTERVERTEBRAL DISC DEGENERATION, THORACIC REGION: ICD-10-CM

## 2019-02-26 PROCEDURE — 72074 X-RAY EXAM THORAC SPINE4/>VW: CPT | Mod: 26

## 2019-02-26 PROCEDURE — 93010 ELECTROCARDIOGRAM REPORT: CPT

## 2019-02-26 PROCEDURE — 99283 EMERGENCY DEPT VISIT LOW MDM: CPT | Mod: 25

## 2019-02-26 NOTE — ED STATDOCS - PROGRESS NOTE DETAILS
34 y/o M with PMH of HTN presents with mid back pain and "bumps" on his back x 1 year. Reports associated tingling sensation in his back as well. Denies fever, chills, nausae, vomiting, weakness in extremities. Reports dizziness, worse when waking. PE: anxious appearing. Cardiac: s1/s2, RRR. Lungs: CTAB. MSK: Lipoma vs. cyst left lateral T-spine. No midline T-spine tenderness. Sensation intact over posterior and anterior chest wall. Patient ambulatory with no deviation in gait. Abdomen: NBS x4, soft, nontender. A/P: Likely lipoma vs. cyst surrounding T-spine. Plan for T-spine XR. EKG. reassess. - Wyatt Meadows PA-C XR and EKG unremarkable. Patient reassured. Will provide referral for outpatient ortho-spine, dermatology and cardiology. - Wyatt Meadows PA-C

## 2019-02-26 NOTE — ED STATDOCS - CARE PROVIDER_API CALL
Jose Drake)  Orthopaedics Orthopedic Surgery  [address], [place] [address]  Phone: [phone]  Fax: [phone]  Follow Up Time:     Prateek Walker)  Cardiovascular Disease  [address] [place] [address]  Phone: [phone]  Fax: [phone]  Follow Up Time:

## 2019-02-26 NOTE — ED STATDOCS - ATTENDING CONTRIBUTION TO CARE
Attending Contribution to Care: I, Gabriela Mcrae, performed the initial face to face bedside interview with this patient regarding history of present illness, review of symptoms and relevant past medical, social and family history.  I completed an independent physical examination.  I was the initial provider who evaluated this patient. I have signed out the follow up of any pending tests (i.e. labs, radiological studies) to the ACP.  I have communicated the patient’s plan of care and disposition with the ACP.

## 2019-02-26 NOTE — ED ADULT NURSE NOTE - NSIMPLEMENTINTERV_GEN_ALL_ED
Implemented All Universal Safety Interventions:  [place] to call system. Call bell, personal items and telephone within reach. Instruct patient to call for assistance. Room bathroom lighting operational. Non-slip footwear when patient is off stretcher. Physically safe environment: no spills, clutter or unnecessary equipment. Stretcher in lowest position, wheels locked, appropriate side rails in place.

## 2019-02-26 NOTE — ED STATDOCS - CLINICAL SUMMARY MEDICAL DECISION MAKING FREE TEXT BOX
Pt with tingling, left sided thoracic pain for one year no evidence of shingles, EKG and thoracic spine XR ordered, outpatient ortho/spine and Holter monitor. Pt with tingling, left sided thoracic pain for one year no evidence of shingles, EKG and thoracic spine XR ordered, outpatient ortho/spine and Holter monitor. XR and EKG unremarkable. Patient reassured. Will provide referral for outpatient ortho-spine, dermatology and cardiology.

## 2019-02-26 NOTE — ED ADULT TRIAGE NOTE - CHIEF COMPLAINT QUOTE
pt p/w c/o tingling in the thoracic spine region for about one year.  Pt presents today with increasing pain and in the last 5 days pt states "tumors developed on my back." pt describes abnormal growths that have appeared on his back since Thursday.

## 2019-02-26 NOTE — ED STATDOCS - CARE PLAN
Principal Discharge DX:	DDD (degenerative disc disease), thoracic  Secondary Diagnosis:	Lipoma, unspecified site Principal Discharge DX:	DDD (degenerative disc disease), thoracic  Secondary Diagnosis:	Subcutaneous cyst

## 2019-02-26 NOTE — ED STATDOCS - OBJECTIVE STATEMENT
34 y/o male with a PMHx of HTN, Herniated Cervical disc, Lipoma presents to the ED c/o left sided back pain x 1 year. +bumps near the middle of back since yesterday. Pt describes the pain as a "tingling" sensation. Pt also states that recently some nights he wakes up dizzy.

## 2019-03-13 ENCOUNTER — RX RENEWAL (OUTPATIENT)
Age: 34
End: 2019-03-13

## 2019-04-15 ENCOUNTER — RX RENEWAL (OUTPATIENT)
Age: 34
End: 2019-04-15

## 2019-04-18 ENCOUNTER — TRANSCRIPTION ENCOUNTER (OUTPATIENT)
Age: 34
End: 2019-04-18

## 2019-04-24 ENCOUNTER — RX RENEWAL (OUTPATIENT)
Age: 34
End: 2019-04-24

## 2019-05-07 ENCOUNTER — RX RENEWAL (OUTPATIENT)
Age: 34
End: 2019-05-07

## 2019-05-31 ENCOUNTER — RX RENEWAL (OUTPATIENT)
Age: 34
End: 2019-05-31

## 2022-04-02 NOTE — ED STATDOCS - NS ED ATTENDING STATEMENT MOD
normal (ped)... I have personally performed a face to face diagnostic evaluation on this patient. I have reviewed the ACP note and agree with the history, exam and plan of care, except as noted.

## 2025-03-24 NOTE — ED STATDOCS - NS ED PATIENT SAFETY CONCERN
Name: Remy Bishop      : 1998      MRN: [id number]  Encounter Provider: Yash Bazzi MD  Encounter Date: 3/24/2025   Encounter department: [facility]  :  Assessment & Plan  Motion sickness, initial encounter  -Patient will be given a prescription for scopolamine patches.  Discussed that the patch can be worn for 72 hours prior to removal.  Discussed potential side effects associated with the patch.  Orders:  •  scopolamine (TRANSDERM-SCOP) 1 mg/3 days TD 72 hr patch; Place 1 patch on the skin over 72 hours every third day    Nausea  -See plan above.              History of Present Illness   HPI  Patient presents today as an acute visit.  He is requesting medication for motion sickness.  He reports he will be going on a cruise this Thursday leaving from work and then going down to [place] and then [place].  Altogether he reports he will spend 4 days on sea.    Review of Systems  All other systems negative except for pertinent findings noted in HPI.     Objective   /78 (BP Location: Left arm, Patient Position: Sitting, Cuff Size: Standard)   Pulse (!) 54   Wt 75.8 kg (167 lb 3.2 oz)   SpO2 98%   BMI 21.47 kg/m²      Physical Exam  General: NAD  HEENT: NCAT, EOMI, normal conjunctiva  Skin: Normal skin color, no rashes   Psychiatric: Normal mood and affect      
No